# Patient Record
Sex: FEMALE | Race: WHITE | NOT HISPANIC OR LATINO | URBAN - METROPOLITAN AREA
[De-identification: names, ages, dates, MRNs, and addresses within clinical notes are randomized per-mention and may not be internally consistent; named-entity substitution may affect disease eponyms.]

---

## 2021-02-26 ENCOUNTER — OFFICE VISIT (OUTPATIENT)
Dept: URBAN - METROPOLITAN AREA CLINIC 94 | Facility: CLINIC | Age: 66
End: 2021-02-26

## 2021-03-12 ENCOUNTER — TELEPHONE ENCOUNTER (OUTPATIENT)
Dept: URBAN - METROPOLITAN AREA CLINIC 70 | Facility: CLINIC | Age: 66
End: 2021-03-12

## 2021-03-24 ENCOUNTER — OFFICE VISIT (OUTPATIENT)
Dept: URBAN - METROPOLITAN AREA CLINIC 70 | Facility: CLINIC | Age: 66
End: 2021-03-24
Payer: MEDICARE

## 2021-03-24 ENCOUNTER — LAB OUTSIDE AN ENCOUNTER (OUTPATIENT)
Dept: URBAN - METROPOLITAN AREA CLINIC 70 | Facility: CLINIC | Age: 66
End: 2021-03-24

## 2021-03-24 ENCOUNTER — WEB ENCOUNTER (OUTPATIENT)
Dept: URBAN - METROPOLITAN AREA CLINIC 70 | Facility: CLINIC | Age: 66
End: 2021-03-24

## 2021-03-24 VITALS
TEMPERATURE: 98.1 F | SYSTOLIC BLOOD PRESSURE: 146 MMHG | BODY MASS INDEX: 19.96 KG/M2 | HEART RATE: 67 BPM | HEIGHT: 66 IN | WEIGHT: 124.2 LBS | DIASTOLIC BLOOD PRESSURE: 84 MMHG

## 2021-03-24 DIAGNOSIS — K83.8 INTRAHEPATIC BILE DUCT DILATION: ICD-10-CM

## 2021-03-24 DIAGNOSIS — R10.11 RUQ ABDOMINAL PAIN: ICD-10-CM

## 2021-03-24 DIAGNOSIS — K21.9 GERD WITHOUT ESOPHAGITIS: ICD-10-CM

## 2021-03-24 PROBLEM — 123608004: Status: ACTIVE | Noted: 2021-03-24

## 2021-03-24 PROCEDURE — 99203 OFFICE O/P NEW LOW 30 MIN: CPT | Performed by: NURSE PRACTITIONER

## 2021-03-24 RX ORDER — PANTOPRAZOLE SODIUM 40 MG/1
1 TABLET TABLET, DELAYED RELEASE ORAL ONCE A DAY
Qty: 90 | Refills: 3 | OUTPATIENT
Start: 2021-03-24

## 2021-03-24 RX ORDER — SUCRALFATE 1 G
1 TABLET ON AN EMPTY STOMACH TABLET ORAL TWICE A DAY
Status: ACTIVE | COMMUNITY

## 2021-03-24 RX ORDER — SUCRALFATE 1 G
1 TABLET ON AN EMPTY STOMACH TABLET ORAL TWICE A DAY
Qty: 60 | Refills: 0
End: 2021-04-23

## 2021-03-25 LAB
A/G RATIO: 1.6
ALBUMIN: 4.7
ALKALINE PHOSPHATASE: 49
ALT (SGPT): 18
AST (SGOT): 28
BASO (ABSOLUTE): 0.1
BASOS: 1
BILIRUBIN, TOTAL: 0.4
BUN/CREATININE RATIO: 16
BUN: 10
CALCIUM: 9.9
CARBON DIOXIDE, TOTAL: 23
CHLORIDE: 100
CREATININE: 0.61
EGFR IF AFRICN AM: 110
EGFR IF NONAFRICN AM: 95
EOS (ABSOLUTE): 0.2
EOS: 3
GLOBULIN, TOTAL: 2.9
GLUCOSE: 93
HEMATOCRIT: 41.4
HEMATOLOGY COMMENTS:: (no result)
HEMOGLOBIN: 13.5
IMMATURE CELLS: (no result)
IMMATURE GRANS (ABS): 0
IMMATURE GRANULOCYTES: 0
LYMPHS (ABSOLUTE): 2.4
LYMPHS: 37
MCH: 30.9
MCHC: 32.6
MCV: 95
MONOCYTES(ABSOLUTE): 0.6
MONOCYTES: 9
NEUTROPHILS (ABSOLUTE): 3.2
NEUTROPHILS: 50
NRBC: (no result)
PLATELETS: 328
POTASSIUM: 5
PROTEIN, TOTAL: 7.6
RBC: 4.37
RDW: 12
SODIUM: 138
WBC: 6.5

## 2021-03-31 ENCOUNTER — LAB OUTSIDE AN ENCOUNTER (OUTPATIENT)
Dept: URBAN - METROPOLITAN AREA CLINIC 70 | Facility: CLINIC | Age: 66
End: 2021-03-31

## 2021-03-31 ENCOUNTER — OFFICE VISIT (OUTPATIENT)
Dept: URBAN - METROPOLITAN AREA CLINIC 70 | Facility: CLINIC | Age: 66
End: 2021-03-31
Payer: MEDICARE

## 2021-03-31 DIAGNOSIS — K21.9 GERD WITHOUT ESOPHAGITIS: ICD-10-CM

## 2021-03-31 DIAGNOSIS — R10.11 RUQ ABDOMINAL PAIN: ICD-10-CM

## 2021-03-31 PROCEDURE — 99204 OFFICE O/P NEW MOD 45 MIN: CPT | Performed by: NURSE PRACTITIONER

## 2021-03-31 RX ORDER — SUCRALFATE 1 G
1 TABLET ON AN EMPTY STOMACH TABLET ORAL TWICE A DAY
Qty: 60 | Refills: 0 | Status: ACTIVE | COMMUNITY
End: 2021-04-23

## 2021-03-31 RX ORDER — PANTOPRAZOLE SODIUM 40 MG/1
1 TABLET TABLET, DELAYED RELEASE ORAL ONCE A DAY
Qty: 90 | Refills: 3 | Status: ACTIVE | COMMUNITY
Start: 2021-03-24

## 2021-03-31 NOTE — HPI-TODAY'S VISIT:
Pt presents today as a f/u for US and lab results. US RUQ on 3/29/21 normal. CBC and CMP-wnl Previous labs 11/24/20 found CBC and CMP wnl (LFT's wnll). Labs 2/20/21 found CMP wnl as well as lipase wnl. US RUQ on 3/29/21 showed normal gallbladder, CBD, and no evidence of intrahepatic ductal dilation. Pt continues to have moderate RUQ pain that is made worse with fried/greasy foods. She voices frustration that the work up so far has been negative so far as she continues to have significant RUQ pain. She was started on Protonix 40 mg daily 1 week ago at [facility] for possible PUD.  OF NOTE LOV:  Pt is a 65 yr old female whom presents regarding RUQ abdominal pain. Pt had left a VM 3/12/21 that she was having severe RUQ abdominal last week and was instructed to go to ED. Pt did not go to ED last week. CT A/P 2/24/21 found mild intrahepatic ductal dilation of uncertain etiology  with normal CBD.  Completely contracted gallbladder.  Concern for significant gallbladder disease.   A 2 cm x 1 cm right liver lobe small cyst or hemangioma. Normal pancreas. HIDA scan 3/8/21 showed EF of 39%. No recent labs available at this time. Pt had a Colonoscopy 11/4/19 with diverticulosis observed-5 yr recall. EGD 11/18/14 found mildly severe esophagitis and gastritis.  Pathology was positive for H pylori. Today pt reports she has had RUQ which has been moderate to severe and ongoing for 2 months.   Pain made worse with any foods, but not liquids. Denies vomiting, but has had some nausea.

## 2021-04-27 ENCOUNTER — CLAIMS CREATED FROM THE CLAIM WINDOW (OUTPATIENT)
Dept: URBAN - METROPOLITAN AREA CLINIC 4 | Facility: CLINIC | Age: 66
End: 2021-04-27
Payer: MEDICARE

## 2021-04-27 ENCOUNTER — OFFICE VISIT (OUTPATIENT)
Dept: URBAN - METROPOLITAN AREA SURGERY CENTER 24 | Facility: SURGERY CENTER | Age: 66
End: 2021-04-27
Payer: MEDICARE

## 2021-04-27 DIAGNOSIS — K31.7 POLYP OF STOMACH AND DUODENUM: ICD-10-CM

## 2021-04-27 DIAGNOSIS — K31.89 REACTIVE GASTROPATHY: ICD-10-CM

## 2021-04-27 DIAGNOSIS — D13.2 BENIGN NEOPLASM OF DUODENUM: ICD-10-CM

## 2021-04-27 DIAGNOSIS — K21.9 GASTRO-ESOPHAGEAL REFLUX DISEASE WITHOUT ESOPHAGITIS: ICD-10-CM

## 2021-04-27 DIAGNOSIS — K29.80 PEPTIC DUODENITIS: ICD-10-CM

## 2021-04-27 DIAGNOSIS — K21.9 ACID REFLUX: ICD-10-CM

## 2021-04-27 PROCEDURE — 88312 SPECIAL STAINS GROUP 1: CPT | Performed by: PATHOLOGY

## 2021-04-27 PROCEDURE — G8907 PT DOC NO EVENTS ON DISCHARG: HCPCS | Performed by: INTERNAL MEDICINE

## 2021-04-27 PROCEDURE — 43239 EGD BIOPSY SINGLE/MULTIPLE: CPT | Performed by: INTERNAL MEDICINE

## 2021-04-27 PROCEDURE — 88305 TISSUE EXAM BY PATHOLOGIST: CPT | Performed by: PATHOLOGY

## 2021-04-27 RX ORDER — PANTOPRAZOLE SODIUM 40 MG/1
1 TABLET TABLET, DELAYED RELEASE ORAL ONCE A DAY
Qty: 90 | Refills: 3 | Status: ACTIVE | COMMUNITY
Start: 2021-03-24

## 2021-04-29 ENCOUNTER — TELEPHONE ENCOUNTER (OUTPATIENT)
Dept: URBAN - METROPOLITAN AREA CLINIC 70 | Facility: CLINIC | Age: 66
End: 2021-04-29

## 2021-05-03 ENCOUNTER — TELEPHONE ENCOUNTER (OUTPATIENT)
Dept: URBAN - METROPOLITAN AREA CLINIC 70 | Facility: CLINIC | Age: 66
End: 2021-05-03

## 2021-05-25 ENCOUNTER — OFFICE VISIT (OUTPATIENT)
Dept: URBAN - METROPOLITAN AREA CLINIC 70 | Facility: CLINIC | Age: 66
End: 2021-05-25

## 2021-05-25 RX ORDER — PANTOPRAZOLE SODIUM 40 MG/1
1 TABLET TABLET, DELAYED RELEASE ORAL ONCE A DAY
Qty: 90 | Refills: 3 | Status: ACTIVE | COMMUNITY
Start: 2021-03-24

## 2021-06-18 ENCOUNTER — OFFICE VISIT (OUTPATIENT)
Dept: URBAN - METROPOLITAN AREA CLINIC 70 | Facility: CLINIC | Age: 66
End: 2021-06-18
Payer: MEDICARE

## 2021-06-18 VITALS
BODY MASS INDEX: 20.63 KG/M2 | HEIGHT: 66 IN | SYSTOLIC BLOOD PRESSURE: 147 MMHG | WEIGHT: 128.4 LBS | TEMPERATURE: 98.2 F | DIASTOLIC BLOOD PRESSURE: 73 MMHG | HEART RATE: 73 BPM

## 2021-06-18 DIAGNOSIS — K59.01 CONSTIPATION BY DELAYED COLONIC TRANSIT: ICD-10-CM

## 2021-06-18 DIAGNOSIS — K21.9 GERD WITHOUT ESOPHAGITIS: ICD-10-CM

## 2021-06-18 PROCEDURE — 99213 OFFICE O/P EST LOW 20 MIN: CPT | Performed by: NURSE PRACTITIONER

## 2021-06-18 RX ORDER — PANTOPRAZOLE SODIUM 40 MG/1
1 TABLET TABLET, DELAYED RELEASE ORAL TWICE DAILY
Qty: 180 | Refills: 3
Start: 2021-03-24

## 2021-06-18 RX ORDER — PANTOPRAZOLE SODIUM 40 MG/1
1 TABLET TABLET, DELAYED RELEASE ORAL ONCE A DAY
Qty: 90 | Refills: 3 | Status: ACTIVE | COMMUNITY
Start: 2021-03-24

## 2021-06-18 RX ORDER — SUCRALFATE 1 G/1
1 TABLET ON AN EMPTY STOMACH TABLET ORAL TWICE A DAY
Qty: 60 | Refills: 1 | OUTPATIENT
Start: 2021-06-18 | End: 2021-08-17

## 2021-06-18 NOTE — HPI-TODAY'S VISIT:
Pt presents for a f/u after EGD. 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  Neg for EOE or HP. She was referred to a surgeon [facility] for possible cholecystectomy due to borderline HIDA scan result of 39%. Pt reports she saw Dr Beaulieu and he determined that a cholecystectomy was not indicated. Pt reports that her RUQ abdominal pain has improved with daily Protonix, but continues some intermittent discomfort. Reports her stools are sometimes hard/lumpy and takes Metamucil prn.    of note lov Pt presents today as a f/u for US and lab results. US RUQ on 3/29/21 normal. CBC and CMP-wnl Previous labs 11/24/20 found CBC and CMP wnl (LFT's wnll). Labs 2/20/21 found CMP wnl as well as lipase wnl. US RUQ on 3/29/21 showed normal gallbladder, CBD, and no evidence of intrahepatic ductal dilation. Pt continues to have moderate RUQ pain that is made worse with fried/greasy foods. She voices frustration that the work up so far has been negative so far as she continues to have significant RUQ pain. She was started on Protonix 40 mg daily 1 week ago at [facility] for possible PUD.  OF NOTE LOV 3/31/21:  Pt is a 65 yr old female whom presents regarding RUQ abdominal pain. Pt had left a VM 3/12/21 that she was having severe RUQ abdominal last week and was instructed to go to ED. Pt did not go to ED last week. CT A/P 2/24/21 found mild intrahepatic ductal dilation of uncertain etiology  with normal CBD.  Completely contracted gallbladder.  Concern for significant gallbladder disease.   A 2 cm x 1 cm right liver lobe small cyst or hemangioma. Normal pancreas. HIDA scan 3/8/21 showed EF of 39%. No recent labs available at this time. Pt had a Colonoscopy 11/4/19 with diverticulosis observed-5 yr recall. EGD 11/18/14 found mildly severe esophagitis and gastritis.  Pathology was positive for H pylori. Today pt reports she has had RUQ which has been moderate to severe and ongoing for 2 months.   Pain made worse with any foods, but not liquids. Denies vomiting, but has had some nausea.

## 2022-08-09 NOTE — HPI-TODAY'S VISIT:
Pt is a 65 yr old female whom presents regarding RUQ abdominal pain. Pt had left a VM 3/12/21 that she was having severe RUQ abdominal last week and was instructed to go to ED. Pt did not go to ED last week. CT A/P 2/24/21 found mild intrahepatic ductal dilation of uncertain etiology  with normal CBD.  Completely contracted gallbladder.  Concern for significant gallbladder disease.   A 2 cm x 1 cm right liver lobe small cyst or hemangioma. Normal pancreas. HIDA scan 3/8/21 showed EF of 39%. No recent labs available at this time. Pt had a Colonoscopy 11/4/19 with diverticulosis observed-5 yr recall. EGD 11/18/14 found mildly severe esophagitis and gastritis.  Pathology was positive for H pylori. Today pt reports she has had RUQ which has been moderate to severe and ongoing for 2 months.   Pain made worse with any foods, but not liquids. Denies vomiting, but has had some nausea.
Patient tolerated procedure well.

## 2023-02-21 ENCOUNTER — OFFICE VISIT (OUTPATIENT)
Dept: URBAN - METROPOLITAN AREA CLINIC 70 | Facility: CLINIC | Age: 68
End: 2023-02-21
Payer: MEDICARE

## 2023-02-21 ENCOUNTER — TELEPHONE ENCOUNTER (OUTPATIENT)
Dept: URBAN - METROPOLITAN AREA CLINIC 70 | Facility: CLINIC | Age: 68
End: 2023-02-21

## 2023-02-21 ENCOUNTER — WEB ENCOUNTER (OUTPATIENT)
Dept: URBAN - METROPOLITAN AREA CLINIC 70 | Facility: CLINIC | Age: 68
End: 2023-02-21

## 2023-02-21 VITALS
TEMPERATURE: 97.7 F | BODY MASS INDEX: 17.98 KG/M2 | SYSTOLIC BLOOD PRESSURE: 124 MMHG | HEART RATE: 70 BPM | DIASTOLIC BLOOD PRESSURE: 69 MMHG | HEIGHT: 66 IN | WEIGHT: 111.9 LBS

## 2023-02-21 DIAGNOSIS — Z86.010 HISTORY OF COLON POLYPS: ICD-10-CM

## 2023-02-21 DIAGNOSIS — K21.9 GERD WITHOUT ESOPHAGITIS: ICD-10-CM

## 2023-02-21 DIAGNOSIS — R10.10 UPPER ABDOMINAL PAIN: ICD-10-CM

## 2023-02-21 DIAGNOSIS — K59.01 CONSTIPATION BY DELAYED COLONIC TRANSIT: ICD-10-CM

## 2023-02-21 PROBLEM — 428283002: Status: ACTIVE | Noted: 2023-02-21

## 2023-02-21 PROBLEM — 35298007: Status: ACTIVE | Noted: 2021-06-18

## 2023-02-21 PROBLEM — 266435005: Status: ACTIVE | Noted: 2021-03-24

## 2023-02-21 PROCEDURE — 99214 OFFICE O/P EST MOD 30 MIN: CPT | Performed by: NURSE PRACTITIONER

## 2023-02-21 RX ORDER — METOPROLOL TARTRATE 25 MG/1
1 TABLET WITH FOOD TABLET, FILM COATED ORAL TWICE A DAY
Status: ACTIVE | COMMUNITY

## 2023-02-21 RX ORDER — PANTOPRAZOLE SODIUM 40 MG/1
1 TABLET TABLET, DELAYED RELEASE ORAL TWICE DAILY
Qty: 180 | Refills: 3 | Status: ON HOLD | COMMUNITY
Start: 2021-03-24

## 2023-02-21 RX ORDER — DICYCLOMINE HYDROCHLORIDE 10 MG/1
1 CAPSULE CAPSULE ORAL
Qty: 30 | Refills: 2 | OUTPATIENT
Start: 2023-02-21 | End: 2023-03-23

## 2023-02-21 RX ORDER — LORAZEPAM 0.5 MG/1
1 TABLET AT BEDTIME AS NEEDED TABLET ORAL ONCE A DAY
Status: ACTIVE | COMMUNITY

## 2023-02-21 RX ORDER — PANTOPRAZOLE SODIUM 40 MG/1
1 TABLET TABLET, DELAYED RELEASE ORAL ONCE A DAY
Qty: 90 TABLET | Refills: 3
Start: 2021-03-24

## 2023-02-21 NOTE — HPI-TODAY'S VISIT:
OF NOTE LOV 3/24/21: Pt is a 65 yr old female whom presents regarding RUQ abdominal pain. Pt had left a VM 3/12/21 that she was having severe RUQ abdominal last week and was instructed to go to ED. Pt did not go to ED last week. CT A/P 2/24/21 found mild intrahepatic ductal dilation of uncertain etiology  with normal CBD.  Completely contracted gallbladder.  Concern for significant gallbladder disease.   A 2 cm x 1 cm right liver lobe small cyst or hemangioma. Normal pancreas. HIDA scan 3/8/21 showed EF of 39%. No recent labs available at this time. Pt had a Colonoscopy 11/4/19 with diverticulosis observed-5 yr recall. EGD 11/18/14 found mildly severe esophagitis and gastritis.  Pathology was positive for H pylori. Today pt reports she has had RUQ which has been moderate to severe and ongoing for 2 months.   Pain made worse with any foods, but not liquids. Denies vomiting, but has had some nausea. -------------------------------------------------------------------- of note 3/31/21: Pt presents today as a f/u for US and lab results. US RUQ on 3/29/21 normal. CBC and CMP-wnl Previous labs 11/24/20 found CBC and CMP wnl (LFT's wnll). Labs 2/20/21 found CMP wnl as well as lipase wnl. US RUQ on 3/29/21 showed normal gallbladder, CBD, and no evidence of intrahepatic ductal dilation. Pt continues to have moderate RUQ pain that is made worse with fried/greasy foods. She voices frustration that the work up so far has been negative so far as she continues to have significant RUQ pain. She was started on Protonix 40 mg daily 1 week ago at [facility] for possible PUD. ------------------------------------------- OV 6/18/21 Michelle Azelton NP: Pt presents for a f/u after EGD. 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  Neg for EOE or HP. She was referred to a surgeon [facility] for possible cholecystectomy due to borderline HIDA scan result of 39%. Pt reports she saw Dr Beaulieu and he determined that a cholecystectomy was not indicated. Pt reports that her RUQ abdominal pain has improved with daily Protonix, but continues some intermittent discomfort. Reports her stools are sometimes hard/lumpy and takes Metamucil prn. --------------------------------------------- Today 2/21/23: Patient presents today with intermittent upper abdominal pain predominately under right breast/RUQ but pain can sometimes be all across upper abdomen. Pain has been recurrent and constant for last couple of weeks. She is no longer taking daily PPI. Admits that she is going through significant stress at the moment with her  having cancer. Was on ASA for 1 month previously due to an abnormal EKG which was followed up by stress test that was abnormal and then a cardiac cath that was negative, so she is not off ASA. Denies fever, SOB, wt loss, dysphagia, odynophagia, N/V, diarrhea, or signs of GI bleeding. Takes daily fiber with chronic constipation well controlled. Had extensive prior workup for similar symptoms has above. She states that she is unable to undergo testing at this time due to having to be available for her  and refused repeat EGD, CT scan or repeat HIDA scan which was discussed with patient for further workup of pain.

## 2023-03-30 ENCOUNTER — OFFICE VISIT (OUTPATIENT)
Dept: URBAN - METROPOLITAN AREA CLINIC 80 | Facility: CLINIC | Age: 68
End: 2023-03-30

## 2023-04-03 ENCOUNTER — TELEPHONE ENCOUNTER (OUTPATIENT)
Dept: URBAN - METROPOLITAN AREA CLINIC 70 | Facility: CLINIC | Age: 68
End: 2023-04-03

## 2023-04-04 ENCOUNTER — LAB OUTSIDE AN ENCOUNTER (OUTPATIENT)
Dept: URBAN - METROPOLITAN AREA CLINIC 70 | Facility: CLINIC | Age: 68
End: 2023-04-04

## 2023-04-07 ENCOUNTER — APPOINTMENT (RX ONLY)
Dept: URBAN - METROPOLITAN AREA CLINIC 46 | Facility: CLINIC | Age: 68
Setting detail: DERMATOLOGY
End: 2023-04-07

## 2023-04-07 DIAGNOSIS — D22 MELANOCYTIC NEVI: ICD-10-CM

## 2023-04-07 DIAGNOSIS — L82.1 OTHER SEBORRHEIC KERATOSIS: ICD-10-CM

## 2023-04-07 DIAGNOSIS — D18.0 HEMANGIOMA: ICD-10-CM

## 2023-04-07 DIAGNOSIS — L57.0 ACTINIC KERATOSIS: ICD-10-CM

## 2023-04-07 DIAGNOSIS — L81.4 OTHER MELANIN HYPERPIGMENTATION: ICD-10-CM

## 2023-04-07 DIAGNOSIS — L65.0 TELOGEN EFFLUVIUM: ICD-10-CM

## 2023-04-07 PROBLEM — D22.5 MELANOCYTIC NEVI OF TRUNK: Status: ACTIVE | Noted: 2023-04-07

## 2023-04-07 PROBLEM — D18.01 HEMANGIOMA OF SKIN AND SUBCUTANEOUS TISSUE: Status: ACTIVE | Noted: 2023-04-07

## 2023-04-07 PROCEDURE — ? COUNSELING

## 2023-04-07 PROCEDURE — ? LIQUID NITROGEN

## 2023-04-07 PROCEDURE — 17000 DESTRUCT PREMALG LESION: CPT

## 2023-04-07 PROCEDURE — ? PRESCRIPTION

## 2023-04-07 PROCEDURE — ? ADDITIONAL NOTES

## 2023-04-07 PROCEDURE — 99203 OFFICE O/P NEW LOW 30 MIN: CPT | Mod: 25

## 2023-04-07 PROCEDURE — 17003 DESTRUCT PREMALG LES 2-14: CPT

## 2023-04-07 RX ORDER — MINOXIDIL 5 %
1 SOLUTION, NON-ORAL TOPICAL
Qty: 180 | Refills: 3 | Status: ERX | COMMUNITY
Start: 2023-04-07

## 2023-04-07 RX ADMIN — Medication 1: at 00:00

## 2023-04-07 ASSESSMENT — LOCATION DETAILED DESCRIPTION DERM
LOCATION DETAILED: RIGHT MEDIAL FRONTAL SCALP
LOCATION DETAILED: LEFT MID-UPPER BACK
LOCATION DETAILED: RIGHT CENTRAL EYEBROW
LOCATION DETAILED: LEFT SUPERIOR MEDIAL UPPER BACK
LOCATION DETAILED: RIGHT MEDIAL FOREHEAD
LOCATION DETAILED: RIGHT SUPERIOR PARIETAL SCALP
LOCATION DETAILED: EPIGASTRIC SKIN
LOCATION DETAILED: INFERIOR THORACIC SPINE
LOCATION DETAILED: LEFT INFERIOR UPPER BACK

## 2023-04-07 ASSESSMENT — LOCATION SIMPLE DESCRIPTION DERM
LOCATION SIMPLE: UPPER BACK
LOCATION SIMPLE: ABDOMEN
LOCATION SIMPLE: SCALP
LOCATION SIMPLE: RIGHT SCALP
LOCATION SIMPLE: RIGHT FOREHEAD
LOCATION SIMPLE: LEFT UPPER BACK
LOCATION SIMPLE: RIGHT EYEBROW

## 2023-04-07 ASSESSMENT — LOCATION ZONE DERM
LOCATION ZONE: SCALP
LOCATION ZONE: TRUNK
LOCATION ZONE: FACE

## 2023-04-18 ENCOUNTER — WEB ENCOUNTER (OUTPATIENT)
Dept: URBAN - METROPOLITAN AREA CLINIC 70 | Facility: CLINIC | Age: 68
End: 2023-04-18

## 2023-04-21 ENCOUNTER — OFFICE VISIT (OUTPATIENT)
Dept: URBAN - METROPOLITAN AREA CLINIC 70 | Facility: CLINIC | Age: 68
End: 2023-04-21
Payer: MEDICARE

## 2023-04-21 ENCOUNTER — WEB ENCOUNTER (OUTPATIENT)
Dept: URBAN - METROPOLITAN AREA CLINIC 70 | Facility: CLINIC | Age: 68
End: 2023-04-21

## 2023-04-21 ENCOUNTER — LAB OUTSIDE AN ENCOUNTER (OUTPATIENT)
Dept: URBAN - METROPOLITAN AREA CLINIC 70 | Facility: CLINIC | Age: 68
End: 2023-04-21

## 2023-04-21 VITALS
DIASTOLIC BLOOD PRESSURE: 69 MMHG | SYSTOLIC BLOOD PRESSURE: 120 MMHG | HEART RATE: 64 BPM | BODY MASS INDEX: 18.16 KG/M2 | HEIGHT: 66 IN | WEIGHT: 113 LBS

## 2023-04-21 DIAGNOSIS — R10.10 UPPER ABDOMINAL PAIN: ICD-10-CM

## 2023-04-21 DIAGNOSIS — Z86.010 HISTORY OF COLON POLYPS: ICD-10-CM

## 2023-04-21 DIAGNOSIS — K21.9 GERD WITHOUT ESOPHAGITIS: ICD-10-CM

## 2023-04-21 DIAGNOSIS — K59.01 CONSTIPATION BY DELAYED COLONIC TRANSIT: ICD-10-CM

## 2023-04-21 PROCEDURE — 99214 OFFICE O/P EST MOD 30 MIN: CPT | Performed by: NURSE PRACTITIONER

## 2023-04-21 RX ORDER — PANTOPRAZOLE SODIUM 40 MG/1
1 TABLET TABLET, DELAYED RELEASE ORAL ONCE A DAY
Qty: 90 TABLET | Refills: 3 | Status: ACTIVE | COMMUNITY
Start: 2021-03-24

## 2023-04-21 RX ORDER — PANTOPRAZOLE SODIUM 40 MG/1
1 TABLET TABLET, DELAYED RELEASE ORAL ONCE A DAY
OUTPATIENT
Start: 2021-03-24

## 2023-04-21 RX ORDER — LORAZEPAM 0.5 MG/1
1 TABLET AT BEDTIME AS NEEDED TABLET ORAL ONCE A DAY
Status: ACTIVE | COMMUNITY

## 2023-04-21 RX ORDER — METOPROLOL TARTRATE 25 MG/1
1/2 TABLET WITH FOOD TABLET, FILM COATED ORAL TWICE A DAY
Status: ACTIVE | COMMUNITY

## 2023-04-21 NOTE — HPI-TODAY'S VISIT:
OF NOTE LOV 3/24/21: Pt is a 65 yr old female whom presents regarding RUQ abdominal pain. Pt had left a VM 3/12/21 that she was having severe RUQ abdominal last week and was instructed to go to ED. Pt did not go to ED last week. CT A/P 2/24/21 found mild intrahepatic ductal dilation of uncertain etiology  with normal CBD.  Completely contracted gallbladder.  Concern for significant gallbladder disease.   A 2 cm x 1 cm right liver lobe small cyst or hemangioma. Normal pancreas. HIDA scan 3/8/21 showed EF of 39%. No recent labs available at this time. Pt had a Colonoscopy 11/4/19 with diverticulosis observed-5 yr recall. EGD 11/18/14 found mildly severe esophagitis and gastritis.  Pathology was positive for H pylori. Today pt reports she has had RUQ which has been moderate to severe and ongoing for 2 months.   Pain made worse with any foods, but not liquids. Denies vomiting, but has had some nausea. -------------------------------------------------------------------- of note 3/31/21: Pt presents today as a f/u for US and lab results. US RUQ on 3/29/21 normal. CBC and CMP-wnl Previous labs 11/24/20 found CBC and CMP wnl (LFT's wnll). Labs 2/20/21 found CMP wnl as well as lipase wnl. US RUQ on 3/29/21 showed normal gallbladder, CBD, and no evidence of intrahepatic ductal dilation. Pt continues to have moderate RUQ pain that is made worse with fried/greasy foods. She voices frustration that the work up so far has been negative so far as she continues to have significant RUQ pain. She was started on Protonix 40 mg daily 1 week ago at [facility] for possible PUD. ------------------------------------------- OV 6/18/21 Michelle Elias NP: Pt presents for a f/u after EGD. 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  Neg for EOE or HP. She was referred to a surgeon [facility] for possible cholecystectomy due to borderline HIDA scan result of 39%. Pt reports she saw Dr Beaulieu and he determined that a cholecystectomy was not indicated. Pt reports that her RUQ abdominal pain has improved with daily Protonix, but continues some intermittent discomfort. Reports her stools are sometimes hard/lumpy and takes Metamucil prn. --------------------------------------------- OV 2/21/23: Patient presents today with intermittent upper abdominal pain predominately under right breast/RUQ but pain can sometimes be all across upper abdomen. Pain has been recurrent and constant for last couple of weeks. She is no longer taking daily PPI. Admits that she is going through significant stress at the moment with her  having cancer. Was on ASA for 1 month previously due to an abnormal EKG which was followed up by stress test that was abnormal and then a cardiac cath that was negative, so she is not off ASA. Denies fever, SOB, wt loss, dysphagia, odynophagia, N/V, diarrhea, or signs of GI bleeding. Takes daily fiber with chronic constipation well controlled. Had extensive prior workup for similar symptoms has above. She states that she is unable to undergo testing at this time due to having to be available for her  and refused repeat EGD, CT scan or repeat HIDA scan which was discussed with patient for further workup of pain. -------------------------------------------- Today 4/21/23: Patient presents today for follow up. Abdominal CT 4/5/23 showed no acute process. She has been on PPI daily w/o improvement in chronic Upper abdominal pain. Constipation controlled with daily fiber supplement. Emotional (anxious and tearful) upon exam while discussing stress she is under with taking care of her  after tx for cancer. No new GI complaints today. Denies fever, N/V, dysphagia, wt loss, diarrhea, or signs of GI bleeding.

## 2023-04-24 PROBLEM — 83132003: Status: ACTIVE | Noted: 2023-04-24

## 2023-05-18 ENCOUNTER — CLAIMS CREATED FROM THE CLAIM WINDOW (OUTPATIENT)
Dept: URBAN - METROPOLITAN AREA SURGERY CENTER 24 | Facility: SURGERY CENTER | Age: 68
End: 2023-05-18

## 2023-05-18 ENCOUNTER — CLAIMS CREATED FROM THE CLAIM WINDOW (OUTPATIENT)
Dept: URBAN - METROPOLITAN AREA SURGERY CENTER 24 | Facility: SURGERY CENTER | Age: 68
End: 2023-05-18
Payer: MEDICARE

## 2023-05-18 DIAGNOSIS — R10.13 ABDOMINAL PAIN, EPIGASTRIC: ICD-10-CM

## 2023-05-18 PROCEDURE — 43235 EGD DIAGNOSTIC BRUSH WASH: CPT | Performed by: INTERNAL MEDICINE

## 2023-05-18 PROCEDURE — G8907 PT DOC NO EVENTS ON DISCHARG: HCPCS | Performed by: INTERNAL MEDICINE

## 2023-05-18 RX ORDER — METOPROLOL TARTRATE 25 MG/1
1/2 TABLET WITH FOOD TABLET, FILM COATED ORAL TWICE A DAY
Status: ACTIVE | COMMUNITY

## 2023-05-18 RX ORDER — LORAZEPAM 0.5 MG/1
1 TABLET AT BEDTIME AS NEEDED TABLET ORAL ONCE A DAY
Status: ACTIVE | COMMUNITY

## 2023-05-18 RX ORDER — PANTOPRAZOLE SODIUM 40 MG/1
1 TABLET TABLET, DELAYED RELEASE ORAL ONCE A DAY
Status: ACTIVE | COMMUNITY
Start: 2021-03-24

## 2023-06-22 ENCOUNTER — OFFICE VISIT (OUTPATIENT)
Dept: URBAN - METROPOLITAN AREA CLINIC 70 | Facility: CLINIC | Age: 68
End: 2023-06-22

## 2023-06-28 ENCOUNTER — OFFICE VISIT (OUTPATIENT)
Dept: URBAN - METROPOLITAN AREA CLINIC 70 | Facility: CLINIC | Age: 68
End: 2023-06-28
Payer: MEDICARE

## 2023-06-28 VITALS
TEMPERATURE: 97.6 F | BODY MASS INDEX: 18.23 KG/M2 | HEART RATE: 64 BPM | HEIGHT: 66 IN | WEIGHT: 113.4 LBS | DIASTOLIC BLOOD PRESSURE: 75 MMHG | SYSTOLIC BLOOD PRESSURE: 148 MMHG

## 2023-06-28 DIAGNOSIS — K21.9 GERD WITHOUT ESOPHAGITIS: ICD-10-CM

## 2023-06-28 DIAGNOSIS — K59.01 CONSTIPATION BY DELAYED COLONIC TRANSIT: ICD-10-CM

## 2023-06-28 DIAGNOSIS — R10.10 UPPER ABDOMINAL PAIN: ICD-10-CM

## 2023-06-28 DIAGNOSIS — Z86.010 HISTORY OF COLON POLYPS: ICD-10-CM

## 2023-06-28 PROCEDURE — 99214 OFFICE O/P EST MOD 30 MIN: CPT | Performed by: NURSE PRACTITIONER

## 2023-06-28 RX ORDER — METOPROLOL TARTRATE 25 MG/1
1/2 TABLET WITH FOOD TABLET, FILM COATED ORAL TWICE A DAY
Status: ON HOLD | COMMUNITY

## 2023-06-28 RX ORDER — PANTOPRAZOLE SODIUM 40 MG/1
1 TABLET TABLET, DELAYED RELEASE ORAL ONCE A DAY
OUTPATIENT

## 2023-06-28 RX ORDER — LORAZEPAM 0.5 MG/1
1 TABLET AT BEDTIME AS NEEDED TABLET ORAL ONCE A DAY
Status: ACTIVE | COMMUNITY

## 2023-06-28 RX ORDER — PANTOPRAZOLE SODIUM 40 MG/1
1 TABLET TABLET, DELAYED RELEASE ORAL ONCE A DAY
Status: ACTIVE | COMMUNITY
Start: 2021-03-24

## 2023-06-28 NOTE — HPI-TODAY'S VISIT:
OF NOTE LOV 3/24/21: Pt is a 65 yr old female whom presents regarding RUQ abdominal pain. Pt had left a VM 3/12/21 that she was having severe RUQ abdominal last week and was instructed to go to ED. Pt did not go to ED last week. CT A/P 2/24/21 found mild intrahepatic ductal dilation of uncertain etiology  with normal CBD.  Completely contracted gallbladder.  Concern for significant gallbladder disease.   A 2 cm x 1 cm right liver lobe small cyst or hemangioma. Normal pancreas. HIDA scan 3/8/21 showed EF of 39%. No recent labs available at this time. Pt had a Colonoscopy 11/4/19 with diverticulosis observed-5 yr recall. EGD 11/18/14 found mildly severe esophagitis and gastritis.  Pathology was positive for H pylori. Today pt reports she has had RUQ which has been moderate to severe and ongoing for 2 months.   Pain made worse with any foods, but not liquids. Denies vomiting, but has had some nausea. -------------------------------------------------------------------- of note 3/31/21: Pt presents today as a f/u for US and lab results. US RUQ on 3/29/21 normal. CBC and CMP-wnl Previous labs 11/24/20 found CBC and CMP wnl (LFT's wnll). Labs 2/20/21 found CMP wnl as well as lipase wnl. US RUQ on 3/29/21 showed normal gallbladder, CBD, and no evidence of intrahepatic ductal dilation. Pt continues to have moderate RUQ pain that is made worse with fried/greasy foods. She voices frustration that the work up so far has been negative so far as she continues to have significant RUQ pain. She was started on Protonix 40 mg daily 1 week ago at [facility] for possible PUD. ------------------------------------------- OV 6/18/21 Michelle Elias NP: Pt presents for a f/u after EGD. 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  Neg for EOE or HP. She was referred to a surgeon [facility] for possible cholecystectomy due to borderline HIDA scan result of 39%. Pt reports she saw Dr Beaulieu and he determined that a cholecystectomy was not indicated. Pt reports that her RUQ abdominal pain has improved with daily Protonix, but continues some intermittent discomfort. Reports her stools are sometimes hard/lumpy and takes Metamucil prn. --------------------------------------------- OV 2/21/23: Patient presents today with intermittent upper abdominal pain predominately under right breast/RUQ but pain can sometimes be all across upper abdomen. Pain has been recurrent and constant for last couple of weeks. She is no longer taking daily PPI. Admits that she is going through significant stress at the moment with her  having cancer. Was on ASA for 1 month previously due to an abnormal EKG which was followed up by stress test that was abnormal and then a cardiac cath that was negative, so she is not off ASA. Denies fever, SOB, wt loss, dysphagia, odynophagia, N/V, diarrhea, or signs of GI bleeding. Takes daily fiber with chronic constipation well controlled. Had extensive prior workup for similar symptoms has above. She states that she is unable to undergo testing at this time due to having to be available for her  and refused repeat EGD, CT scan or repeat HIDA scan which was discussed with patient for further workup of pain. -------------------------------------------- OV 4/21/23: Patient presents today for follow up. Abdominal CT 4/5/23 showed no acute process. She has been on PPI daily w/o improvement in chronic Upper abdominal pain. Constipation controlled with daily fiber supplement. Emotional (anxious and tearful) upon exam while discussing stress she is under with taking care of her  after tx for cancer. No new GI complaints today. Denies fever, N/V, dysphagia, wt loss, diarrhea, or signs of GI bleeding. -------------------------------------------- Today 6/28/23: Patient presents today for follow. Underwent repeat EGD on 5/18/23 with normal findings. Results discussed with patient. She reports upper pain currenly improved. Pain seems to be correlated with stress but she states her 's is cancer free at this time with still having to have recontructive surgery but since she is less stressed her pain has improved. No new or current GI complaints at this time.

## 2023-06-29 NOTE — PROCEDURE: ADDITIONAL NOTES
Next appt-none (due in Sept)  Last appt- 6/13/23  Last lab-6/12/23
Additional Notes: Start Minoxidil solution 5% - Apply 1x nightly to affected areas on scalp.\\nRecommended OTC Nutrufol supplements daily.\\n\\nHusband was diagnosed with squamous cell internally. Stress is coming from that patient stated.\\n\\nReviewed Bloodwork Feb 2023 CMP unremarkable, CBC unremarkable, iron unremarkable , Vitamin D good.
Detail Level: Simple
Render Risk Assessment In Note?: no
Additional Notes: RTC 3months

## 2024-02-19 ENCOUNTER — OV EP (OUTPATIENT)
Dept: URBAN - METROPOLITAN AREA CLINIC 70 | Facility: CLINIC | Age: 69
End: 2024-02-19
Payer: MEDICARE

## 2024-02-19 ENCOUNTER — LAB (OUTPATIENT)
Dept: URBAN - METROPOLITAN AREA CLINIC 70 | Facility: CLINIC | Age: 69
End: 2024-02-19

## 2024-02-19 VITALS
BODY MASS INDEX: 18.49 KG/M2 | HEIGHT: 65 IN | SYSTOLIC BLOOD PRESSURE: 136 MMHG | DIASTOLIC BLOOD PRESSURE: 73 MMHG | TEMPERATURE: 97.5 F | WEIGHT: 111 LBS | HEART RATE: 57 BPM

## 2024-02-19 DIAGNOSIS — K59.04 CHRONIC IDIOPATHIC CONSTIPATION: ICD-10-CM

## 2024-02-19 DIAGNOSIS — K76.9 LIVER LESION: ICD-10-CM

## 2024-02-19 DIAGNOSIS — R10.13 EPIGASTRIC ABDOMINAL PAIN: ICD-10-CM

## 2024-02-19 DIAGNOSIS — Z86.010 HISTORY OF COLON POLYPS: ICD-10-CM

## 2024-02-19 PROBLEM — 300331000: Status: ACTIVE | Noted: 2024-02-19

## 2024-02-19 PROBLEM — 82934008: Status: ACTIVE | Noted: 2024-02-19

## 2024-02-19 PROCEDURE — 99214 OFFICE O/P EST MOD 30 MIN: CPT | Performed by: REGISTERED NURSE

## 2024-02-19 RX ORDER — SUCRALFATE 1 G/1
1 TABLET ON AN EMPTY STOMACH TABLET ORAL TWICE A DAY
Status: ACTIVE | COMMUNITY

## 2024-02-19 RX ORDER — PANTOPRAZOLE SODIUM 40 MG/1
1 TABLET TABLET, DELAYED RELEASE ORAL ONCE A DAY
OUTPATIENT

## 2024-02-19 RX ORDER — DENOSUMAB 60 MG/ML
AS DIRECTED INJECTION SUBCUTANEOUS
Status: ACTIVE | COMMUNITY

## 2024-02-19 RX ORDER — PANTOPRAZOLE SODIUM 40 MG/1
1 TABLET TABLET, DELAYED RELEASE ORAL ONCE A DAY
Status: ACTIVE | COMMUNITY

## 2024-02-19 RX ORDER — ALPRAZOLAM 1 MG/1
1 TABLET TABLET ORAL TWICE A DAY
Status: ACTIVE | COMMUNITY

## 2024-02-19 RX ORDER — HYOSCYAMINE SULFATE 0.12 MG/1
1 TABLET UNDER THE TONGUE AND ALLOW TO DISSOLVE AS NEEDED TABLET SUBLINGUAL EVERY 8 HOURS
Qty: 90 TABLET | Refills: 5 | OUTPATIENT
Start: 2024-02-19 | End: 2024-08-17

## 2024-02-19 RX ORDER — LORAZEPAM 0.5 MG/1
1 TABLET AT BEDTIME AS NEEDED TABLET ORAL ONCE A DAY
Status: DISCONTINUED | COMMUNITY

## 2024-02-19 RX ORDER — FAMOTIDINE 40 MG/1
1 TABLET TABLET, FILM COATED ORAL TWICE A DAY
Qty: 180 TABLET | Refills: 3 | OUTPATIENT
Start: 2024-02-19

## 2024-02-19 RX ORDER — METOPROLOL TARTRATE 25 MG/1
1/2 TABLET WITH FOOD TABLET, FILM COATED ORAL TWICE A DAY
Status: DISCONTINUED | COMMUNITY

## 2024-02-19 RX ORDER — CITALOPRAM 20 MG/1
1 TABLET TABLET, FILM COATED ORAL ONCE A DAY
Status: ACTIVE | COMMUNITY

## 2024-02-19 NOTE — HPI-TODAY'S VISIT:
Pt presents with persistent upper abdominal pain. Pain is mostly epigastric but will sometimes radiate to the RUQ and LUQ. Recent noncontrast CT abdomen showed a indeterminite right hepatic lesion. CT done last also showed a hepatic lesion that was felt to likely be a hemangioma. She is taking pantoprazole daily but is concerned about it because of her osteoporosis.  She admits to persistent constipation despite taking Metamucil. Bowels move every 2-3 days with feeling of incomplete evacuation. She reports that recent KUB done by her PCP showed constipation.   EGD 5/18/23 was normal Colonoscopy 11/4/19 showed diverticulosis(5 yr recall due to hx of polyps)

## 2024-02-19 NOTE — HPI-OTHER HISTORIES
OF NOTE LOV 3/24/21: Pt is a 65 yr old female whom presents regarding RUQ abdominal pain. Pt had left a VM 3/12/21 that she was having severe RUQ abdominal last week and was instructed to go to ED. Pt did not go to ED last week. CT A/P 2/24/21 found mild intrahepatic ductal dilation of uncertain etiology with normal CBD. Completely contracted gallbladder. Concern for significant gallbladder disease. A 2 cm x 1 cm right liver lobe small cyst or hemangioma. Normal pancreas. HIDA scan 3/8/21 showed EF of 39%. No recent labs available at this time. Pt had a Colonoscopy 11/4/19 with diverticulosis observed-5 yr recall. EGD 11/18/14 found mildly severe esophagitis and gastritis. Pathology was positive for H pylori. Today pt reports she has had RUQ which has been moderate to severe and ongoing for 2 months. Pain made worse with any foods, but not liquids. Denies vomiting, but has had some nausea. -------------------------------------------------------------------- of note 3/31/21: Pt presents today as a f/u for US and lab results. US RUQ on 3/29/21 normal. CBC and CMP-wnl Previous labs 11/24/20 found CBC and CMP wnl (LFT's wnll). Labs 2/20/21 found CMP wnl as well as lipase wnl. US RUQ on 3/29/21 showed normal gallbladder, CBD, and no evidence of intrahepatic ductal dilation. Pt continues to have moderate RUQ pain that is made worse with fried/greasy foods. She voices frustration that the work up so far has been negative so far as she continues to have significant RUQ pain. She was started on Protonix 40 mg daily 1 week ago at [facility] for possible PUD. ------------------------------------------- OV 6/18/21 Michelle Elias NP: Pt presents for a f/u after EGD. 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 Neg for EOE or HP. She was referred to a surgeon [facility] for possible cholecystectomy due to borderline HIDA scan result of 39%. Pt reports she saw Dr Beaulieu and he determined that a cholecystectomy was not indicated. Pt reports that her RUQ abdominal pain has improved with daily Protonix, but continues some intermittent discomfort. Reports her stools are sometimes hard/lumpy and takes Metamucil prn. --------------------------------------------- OV 2/21/23: Patient presents today with intermittent upper abdominal pain predominately under right breast/RUQ but pain can sometimes be all across upper abdomen. Pain has been recurrent and constant for last couple of weeks. She is no longer taking daily PPI. Admits that she is going through significant stress at the moment with her  having cancer. Was on ASA for 1 month previously due to an abnormal EKG which was followed up by stress test that was abnormal and then a cardiac cath that was negative, so she is not off ASA. Denies fever, SOB, wt loss, dysphagia, odynophagia, N/V, diarrhea, or signs of GI bleeding. Takes daily fiber with chronic constipation well controlled. Had extensive prior workup for similar symptoms has above. She states that she is unable to undergo testing at this time due to having to be available for her  and refused repeat EGD, CT scan or repeat HIDA scan which was discussed with patient for further workup of pain. -------------------------------------------- OV 4/21/23: Patient presents today for follow up. Abdominal CT 4/5/23 showed no acute process. She has been on PPI daily w/o improvement in chronic Upper abdominal pain. Constipation controlled with daily fiber supplement. Emotional (anxious and tearful) upon exam while discussing stress she is under with taking care of her  after tx for cancer. No new GI complaints today. Denies fever, N/V, dysphagia, wt loss, diarrhea, or signs of GI bleeding. -------------------------------------------- OV 6/28/23: Patient presents today for follow. Underwent repeat EGD on 5/18/23 with normal findings. Results discussed with patient. She reports upper pain currenly improved. Pain seems to be correlated with stress but she states her 's is cancer free at this time with still having to have recontructive surgery but since she is less stressed her pain has improved. No new or current GI complaints at this time.

## 2024-03-20 ENCOUNTER — COLON (OUTPATIENT)
Dept: URBAN - METROPOLITAN AREA SURGERY CENTER 24 | Facility: SURGERY CENTER | Age: 69
End: 2024-03-20
Payer: MEDICARE

## 2024-03-20 ENCOUNTER — LAB (OUTPATIENT)
Dept: URBAN - METROPOLITAN AREA CLINIC 70 | Facility: CLINIC | Age: 69
End: 2024-03-20

## 2024-03-20 ENCOUNTER — LAB (OUTPATIENT)
Dept: URBAN - METROPOLITAN AREA CLINIC 4 | Facility: CLINIC | Age: 69
End: 2024-03-20
Payer: MEDICARE

## 2024-03-20 DIAGNOSIS — D12.5 BENIGN NEOPLASM OF SIGMOID COLON: ICD-10-CM

## 2024-03-20 DIAGNOSIS — D12.5 ADENOMA OF SIGMOID COLON: ICD-10-CM

## 2024-03-20 DIAGNOSIS — Z86.010 ADENOMAS PERSONAL HISTORY OF COLONIC POLYPS: ICD-10-CM

## 2024-03-20 PROCEDURE — 88305 TISSUE EXAM BY PATHOLOGIST: CPT | Performed by: PATHOLOGY

## 2024-03-20 PROCEDURE — 45385 COLONOSCOPY W/LESION REMOVAL: CPT | Performed by: INTERNAL MEDICINE

## 2024-03-20 RX ORDER — CITALOPRAM 20 MG/1
1 TABLET TABLET, FILM COATED ORAL ONCE A DAY
Status: ACTIVE | COMMUNITY

## 2024-03-20 RX ORDER — DENOSUMAB 60 MG/ML
AS DIRECTED INJECTION SUBCUTANEOUS
Status: ACTIVE | COMMUNITY

## 2024-03-20 RX ORDER — HYOSCYAMINE SULFATE 0.12 MG/1
1 TABLET UNDER THE TONGUE AND ALLOW TO DISSOLVE AS NEEDED TABLET SUBLINGUAL EVERY 8 HOURS
Qty: 90 TABLET | Refills: 5 | Status: ACTIVE | COMMUNITY
Start: 2024-02-19 | End: 2024-08-17

## 2024-03-20 RX ORDER — ALPRAZOLAM 1 MG/1
1 TABLET TABLET ORAL TWICE A DAY
Status: ACTIVE | COMMUNITY

## 2024-03-20 RX ORDER — FAMOTIDINE 40 MG/1
1 TABLET TABLET, FILM COATED ORAL TWICE A DAY
Qty: 180 TABLET | Refills: 3 | Status: ACTIVE | COMMUNITY
Start: 2024-02-19

## 2024-03-20 RX ORDER — SUCRALFATE 1 G/1
1 TABLET ON AN EMPTY STOMACH TABLET ORAL TWICE A DAY
Status: ACTIVE | COMMUNITY

## 2024-03-27 ENCOUNTER — EGD (OUTPATIENT)
Dept: URBAN - METROPOLITAN AREA SURGERY CENTER 24 | Facility: SURGERY CENTER | Age: 69
End: 2024-03-27

## 2024-03-27 RX ORDER — DENOSUMAB 60 MG/ML
AS DIRECTED INJECTION SUBCUTANEOUS
Status: ACTIVE | COMMUNITY

## 2024-03-27 RX ORDER — ALPRAZOLAM 1 MG/1
1 TABLET TABLET ORAL TWICE A DAY
Status: ACTIVE | COMMUNITY

## 2024-03-27 RX ORDER — HYOSCYAMINE SULFATE 0.12 MG/1
1 TABLET UNDER THE TONGUE AND ALLOW TO DISSOLVE AS NEEDED TABLET SUBLINGUAL EVERY 8 HOURS
Qty: 90 TABLET | Refills: 5 | Status: ACTIVE | COMMUNITY
Start: 2024-02-19 | End: 2024-08-17

## 2024-03-27 RX ORDER — FAMOTIDINE 40 MG/1
1 TABLET TABLET, FILM COATED ORAL TWICE A DAY
Qty: 180 TABLET | Refills: 3 | Status: ACTIVE | COMMUNITY
Start: 2024-02-19

## 2024-03-27 RX ORDER — SUCRALFATE 1 G/1
1 TABLET ON AN EMPTY STOMACH TABLET ORAL TWICE A DAY
Status: ACTIVE | COMMUNITY

## 2024-03-27 RX ORDER — CITALOPRAM 20 MG/1
1 TABLET TABLET, FILM COATED ORAL ONCE A DAY
Status: ACTIVE | COMMUNITY

## 2024-03-29 ENCOUNTER — EGD (OUTPATIENT)
Dept: URBAN - METROPOLITAN AREA SURGERY CENTER 24 | Facility: SURGERY CENTER | Age: 69
End: 2024-03-29

## 2024-04-03 ENCOUNTER — COLON (OUTPATIENT)
Dept: URBAN - METROPOLITAN AREA SURGERY CENTER 24 | Facility: SURGERY CENTER | Age: 69
End: 2024-04-03

## 2024-04-24 ENCOUNTER — OV EP (OUTPATIENT)
Dept: URBAN - METROPOLITAN AREA CLINIC 70 | Facility: CLINIC | Age: 69
End: 2024-04-24

## 2024-06-05 ENCOUNTER — TELEPHONE ENCOUNTER (OUTPATIENT)
Dept: URBAN - METROPOLITAN AREA CLINIC 70 | Facility: CLINIC | Age: 69
End: 2024-06-05

## 2024-06-05 RX ORDER — PANTOPRAZOLE SODIUM 20 MG/1
1 TABLET TABLET, DELAYED RELEASE ORAL ONCE A DAY
Qty: 90 TABLET | Refills: 3 | OUTPATIENT
Start: 2024-06-05

## 2024-08-21 NOTE — PHYSICAL EXAM EYES:
Conjuntivae and eyelids appear normal, Sclerae : White without injection I have personally evaluated and examined the patient. The Attending was available to me as a supervising provider if needed.

## 2024-09-03 ENCOUNTER — APPOINTMENT (RX ONLY)
Dept: URBAN - METROPOLITAN AREA CLINIC 33 | Facility: CLINIC | Age: 69
Setting detail: DERMATOLOGY
End: 2024-09-03

## 2024-09-03 DIAGNOSIS — D18.0 HEMANGIOMA: ICD-10-CM

## 2024-09-03 DIAGNOSIS — D22 MELANOCYTIC NEVI: ICD-10-CM

## 2024-09-03 DIAGNOSIS — L65.0 TELOGEN EFFLUVIUM: ICD-10-CM

## 2024-09-03 DIAGNOSIS — L81.4 OTHER MELANIN HYPERPIGMENTATION: ICD-10-CM

## 2024-09-03 DIAGNOSIS — L72.0 EPIDERMAL CYST: ICD-10-CM

## 2024-09-03 DIAGNOSIS — L82.1 OTHER SEBORRHEIC KERATOSIS: ICD-10-CM

## 2024-09-03 PROBLEM — D18.01 HEMANGIOMA OF SKIN AND SUBCUTANEOUS TISSUE: Status: ACTIVE | Noted: 2024-09-03

## 2024-09-03 PROBLEM — D22.62 MELANOCYTIC NEVI OF LEFT UPPER LIMB, INCLUDING SHOULDER: Status: ACTIVE | Noted: 2024-09-03

## 2024-09-03 PROCEDURE — ? SUNSCREEN RECOMMENDATIONS

## 2024-09-03 PROCEDURE — 99213 OFFICE O/P EST LOW 20 MIN: CPT

## 2024-09-03 PROCEDURE — ? PRESCRIPTION MEDICATION MANAGEMENT

## 2024-09-03 PROCEDURE — ? COUNSELING

## 2024-09-03 ASSESSMENT — LOCATION SIMPLE DESCRIPTION DERM
LOCATION SIMPLE: RIGHT SCALP
LOCATION SIMPLE: LEFT CHEEK
LOCATION SIMPLE: LEFT FOREARM
LOCATION SIMPLE: ABDOMEN
LOCATION SIMPLE: RIGHT POSTERIOR UPPER ARM
LOCATION SIMPLE: LEFT POSTERIOR UPPER ARM

## 2024-09-03 ASSESSMENT — LOCATION DETAILED DESCRIPTION DERM
LOCATION DETAILED: LEFT LATERAL ABDOMEN
LOCATION DETAILED: LEFT DISTAL DORSAL FOREARM
LOCATION DETAILED: RIGHT MEDIAL FRONTAL SCALP
LOCATION DETAILED: LEFT PROXIMAL POSTERIOR UPPER ARM
LOCATION DETAILED: LEFT LATERAL MALAR CHEEK
LOCATION DETAILED: RIGHT PROXIMAL POSTERIOR UPPER ARM

## 2024-09-03 ASSESSMENT — LOCATION ZONE DERM
LOCATION ZONE: ARM
LOCATION ZONE: SCALP
LOCATION ZONE: TRUNK
LOCATION ZONE: FACE

## 2024-09-03 NOTE — PROCEDURE: PRESCRIPTION MEDICATION MANAGEMENT
Continue Regimen: Nutrafol
Render In Strict Bullet Format?: No
Detail Level: Zone
Modify Regimen: Increased OTC Minoxidil to 5%

## 2024-09-03 NOTE — HPI: EVALUATION OF SKIN LESION(S)
Hpi Title: Evaluation of Skin Lesions
How Severe Are Your Spot(S)?: mild
Additional History: Patient is present today for FBSE for years.

## 2025-06-23 ENCOUNTER — APPOINTMENT (OUTPATIENT)
Dept: URBAN - METROPOLITAN AREA CLINIC 46 | Facility: CLINIC | Age: 70
Setting detail: DERMATOLOGY
End: 2025-06-23

## 2025-06-23 DIAGNOSIS — L81.0 POSTINFLAMMATORY HYPERPIGMENTATION: ICD-10-CM

## 2025-06-23 DIAGNOSIS — L30.9 DERMATITIS, UNSPECIFIED: ICD-10-CM

## 2025-06-23 PROCEDURE — ? PRESCRIPTION MEDICATION MANAGEMENT

## 2025-06-23 PROCEDURE — ? COUNSELING

## 2025-06-23 PROCEDURE — ? PRESCRIPTION

## 2025-06-23 PROCEDURE — ? ADDITIONAL NOTES

## 2025-06-23 RX ORDER — TRIAMCINOLONE ACETONIDE 1 MG/G
1 CREAM TOPICAL BID
Qty: 454 | Refills: 1 | Status: ERX | COMMUNITY
Start: 2025-06-23

## 2025-06-23 RX ADMIN — TRIAMCINOLONE ACETONIDE 1: 1 CREAM TOPICAL at 00:00

## 2025-06-23 ASSESSMENT — LOCATION ZONE DERM
LOCATION ZONE: TRUNK
LOCATION ZONE: LEG
LOCATION ZONE: ARM

## 2025-06-23 ASSESSMENT — LOCATION DETAILED DESCRIPTION DERM
LOCATION DETAILED: PERIUMBILICAL SKIN
LOCATION DETAILED: SUPERIOR THORACIC SPINE
LOCATION DETAILED: LEFT VENTRAL PROXIMAL FOREARM
LOCATION DETAILED: LEFT POPLITEAL SKIN
LOCATION DETAILED: RIGHT VENTRAL DISTAL FOREARM

## 2025-06-23 ASSESSMENT — SEVERITY ASSESSMENT
SEVERITY: ALMOST CLEAR
SEVERITY: MODERATE

## 2025-06-23 ASSESSMENT — LOCATION SIMPLE DESCRIPTION DERM
LOCATION SIMPLE: UPPER BACK
LOCATION SIMPLE: ABDOMEN
LOCATION SIMPLE: LEFT FOREARM
LOCATION SIMPLE: RIGHT FOREARM
LOCATION SIMPLE: LEFT POPLITEAL SKIN

## 2025-06-23 ASSESSMENT — ITCH NUMERIC RATING SCALE: HOW SEVERE IS YOUR ITCHING?: 5

## 2025-06-23 ASSESSMENT — BSA RASH: BSA RASH: 1

## 2025-06-23 NOTE — PROCEDURE: PRESCRIPTION MEDICATION MANAGEMENT
Detail Level: Zone
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  She happened to be seeing her PCP at that time.  He reasonably prescribed CLOB yana, and then later when the rash was flaring, he sent in PO METHYLPREDNISOLONE.  Patient states that day 4 of 6 on METHYLPRED that she had widespread worsening of a rash on her R arm, axillae, submammary, truncal, and leg skin.  It is itchy.  She thinks the CLOB yana and METHYLPRED are to blame.\\n\\nSome areas of the \"new\" dermatitis look like dermal hypersensitivity or ACD or arthropod bite reaction and some look like folliculitis. I think it could be a latent / worsening ACD from the initial ACD 2/2 poison oak, but it is possible that it is a reaction to the CLOB or METHYLPRED reaction (though this is uncommon).\\n\\nFor now, stop CLOB and METHYLPRED.  \\n\\nStart TMC cream BID x 2-3 weeks then stop.  I am ok if she takes BENADRYL.  I think she should wash with VANICREAM cleanser (coupon given).\\n\\nRTC 4-6 weeks if not resolved.
Render In Strict Bullet Format?: No
Initiate Treatment: Triamcinolone Apply a thin layer to affected areas on rash twice daily x 2 weeks per month as needed

## 2025-06-23 NOTE — PROCEDURE: ADDITIONAL NOTES
Detail Level: Simple
Additional Notes: These are apparently the sites of the initial \"poison oak\" dermatitis.
Render Risk Assessment In Note?: no

## 2025-08-01 ENCOUNTER — APPOINTMENT (OUTPATIENT)
Dept: URBAN - METROPOLITAN AREA CLINIC 46 | Facility: CLINIC | Age: 70
Setting detail: DERMATOLOGY
End: 2025-08-01

## 2025-08-01 DIAGNOSIS — L65.0 TELOGEN EFFLUVIUM: ICD-10-CM

## 2025-08-01 DIAGNOSIS — L30.9 DERMATITIS, UNSPECIFIED: ICD-10-CM | Status: IMPROVED

## 2025-08-01 PROBLEM — L65.9 NONSCARRING HAIR LOSS, UNSPECIFIED: Status: ACTIVE | Noted: 2025-08-01

## 2025-08-01 PROCEDURE — ? ORDER TESTS

## 2025-08-01 PROCEDURE — ? ADDITIONAL NOTES

## 2025-08-01 PROCEDURE — ? PRESCRIPTION MEDICATION MANAGEMENT

## 2025-08-01 PROCEDURE — ? COUNSELING

## 2025-08-01 PROCEDURE — ? HAIR CLIPPING FOR HISTOLOGY

## 2025-08-01 ASSESSMENT — LOCATION DETAILED DESCRIPTION DERM
LOCATION DETAILED: POSTERIOR MID-PARIETAL SCALP
LOCATION DETAILED: LEFT CENTRAL FRONTAL SCALP
LOCATION DETAILED: LEFT ANTERIOR DISTAL UPPER ARM

## 2025-08-01 ASSESSMENT — LOCATION SIMPLE DESCRIPTION DERM
LOCATION SIMPLE: LEFT UPPER ARM
LOCATION SIMPLE: POSTERIOR SCALP
LOCATION SIMPLE: LEFT SCALP

## 2025-08-01 ASSESSMENT — SEVERITY ASSESSMENT: SEVERITY: ALMOST CLEAR

## 2025-08-01 ASSESSMENT — BSA RASH: BSA RASH: 1

## 2025-08-01 ASSESSMENT — LOCATION ZONE DERM
LOCATION ZONE: SCALP
LOCATION ZONE: ARM

## 2025-08-01 NOTE — PROCEDURE: ORDER TESTS
Billing Type: Third-Party Bill
Performing Laboratory: [id number]
Bill For Surgical Tray: no
Expected Date Of Service: 08/01/2025

## 2025-08-01 NOTE — PROCEDURE: ADDITIONAL NOTES
Additional Notes: Dx has been given before after  was diagnosed with cancer. Pt has been changing diet recently.\\n\\nPatient has been diagnosed with TE in the past, but perhaps this is a recurrent episode?  She does have (+) hair pull test today, but also with some breakage, so will send sample for microscopy.
Render Risk Assessment In Note?: no
Detail Level: Simple

## 2025-08-01 NOTE — PROCEDURE: PRESCRIPTION MEDICATION MANAGEMENT
Continue Regimen: Triamcinolone Apply a thin layer to affected areas on rash twice daily x 2 weeks per month as needed
Detail Level: Zone
Plan: Apparent ACD is now virtually resolved after TMC cream.  (Patient does think she reacted negatively to PO METHYLPRED and CLOB yana.) 
Render In Strict Bullet Format?: No

## 2025-08-01 NOTE — PROCEDURE: HAIR CLIPPING FOR HISTOLOGY
Per [employer] website, insurance is active and pt is listed on the attributed list needing a healthy you performed in 2023  Pt needs appt for hy, sent to [facility] to schedule via one note  Sent patient portal message regarding need for: hy                    .  Health Maintenance Due   Topic Date Due    Hepatitis C Screening  Never done    COVID-19 Vaccine (1) Never done    HIV Screening  Never done    TETANUS VACCINE  Never done       
Detail Level: Detailed
Render Path Notes In Note?: No
Lab: [id number]
Lab Facility: [id number]
Billing Type: Third-Party Bill